# Patient Record
Sex: MALE | Race: OTHER | HISPANIC OR LATINO | Employment: FULL TIME | URBAN - METROPOLITAN AREA
[De-identification: names, ages, dates, MRNs, and addresses within clinical notes are randomized per-mention and may not be internally consistent; named-entity substitution may affect disease eponyms.]

---

## 2018-01-15 ENCOUNTER — APPOINTMENT (EMERGENCY)
Dept: CT IMAGING | Facility: HOSPITAL | Age: 23
End: 2018-01-15
Payer: COMMERCIAL

## 2018-01-15 ENCOUNTER — APPOINTMENT (EMERGENCY)
Dept: RADIOLOGY | Facility: HOSPITAL | Age: 23
End: 2018-01-15
Payer: COMMERCIAL

## 2018-01-15 ENCOUNTER — OFFICE VISIT (OUTPATIENT)
Dept: URGENT CARE | Facility: MEDICAL CENTER | Age: 23
End: 2018-01-15
Payer: COMMERCIAL

## 2018-01-15 ENCOUNTER — HOSPITAL ENCOUNTER (EMERGENCY)
Facility: HOSPITAL | Age: 23
Discharge: HOME | End: 2018-01-15
Attending: EMERGENCY MEDICINE | Admitting: EMERGENCY MEDICINE
Payer: COMMERCIAL

## 2018-01-15 VITALS
BODY MASS INDEX: 36.33 KG/M2 | OXYGEN SATURATION: 98 % | HEART RATE: 76 BPM | SYSTOLIC BLOOD PRESSURE: 166 MMHG | RESPIRATION RATE: 16 BRPM | TEMPERATURE: 97.2 F | WEIGHT: 246 LBS | DIASTOLIC BLOOD PRESSURE: 92 MMHG

## 2018-01-15 DIAGNOSIS — R10.9 LEFT FLANK PAIN: Primary | ICD-10-CM

## 2018-01-15 LAB
BACTERIA UR QL AUTO: ABNORMAL /HPF
BILIRUB UR QL STRIP: NEGATIVE
CLARITY UR: CLEAR
COLOR UR: YELLOW
GLUCOSE UR STRIP-MCNC: NEGATIVE MG/DL
HGB UR QL STRIP.AUTO: ABNORMAL
KETONES UR STRIP-MCNC: NEGATIVE MG/DL
LEUKOCYTE ESTERASE UR QL STRIP: ABNORMAL
NITRITE UR QL STRIP: NEGATIVE
NON-SQ EPI CELLS URNS QL MICRO: ABNORMAL /HPF
OTHER STN SPEC: ABNORMAL
PH UR STRIP.AUTO: 7.5 [PH] (ref 4.5–8)
PROT UR STRIP-MCNC: ABNORMAL MG/DL
RBC #/AREA URNS AUTO: ABNORMAL /HPF
SP GR UR STRIP.AUTO: 1.01 (ref 1–1.03)
UROBILINOGEN UR QL STRIP.AUTO: 0.2 E.U./DL
WBC #/AREA URNS AUTO: ABNORMAL /HPF

## 2018-01-15 PROCEDURE — 99213 OFFICE O/P EST LOW 20 MIN: CPT

## 2018-01-15 PROCEDURE — 99284 EMERGENCY DEPT VISIT MOD MDM: CPT

## 2018-01-15 PROCEDURE — 74176 CT ABD & PELVIS W/O CONTRAST: CPT

## 2018-01-15 PROCEDURE — 81001 URINALYSIS AUTO W/SCOPE: CPT

## 2018-01-15 PROCEDURE — 71046 X-RAY EXAM CHEST 2 VIEWS: CPT

## 2018-01-15 PROCEDURE — 81002 URINALYSIS NONAUTO W/O SCOPE: CPT | Performed by: EMERGENCY MEDICINE

## 2018-01-15 RX ORDER — NAPROXEN 500 MG/1
500 TABLET ORAL 2 TIMES DAILY WITH MEALS
Qty: 10 TABLET | Refills: 0 | Status: SHIPPED | OUTPATIENT
Start: 2018-01-15 | End: 2018-10-03

## 2018-01-15 NOTE — ED PROVIDER NOTES
History  Chief Complaint   Patient presents with    Flank Pain     left sided flank pain for a week and a half, slipped on friday which exacerbated pain  24 yo male with asthma, c/o onset of pain he localizes to the far left lateral chest flank area, onset about 2 weeks ago without clear exacerbating factor, and increased in pain when he slipped the about 4 days ago, and didn't actually fall but hyperextended his left side to regain his balance  Then over the last 2 days he has had mild URI symptoms, so that cough and vomiting exacerbates the pain  He was seen at [place] [address] [facility] and sent to the ED  Denies abdominal pain        History provided by:  Patient  Flank Pain   Onset quality:  Gradual  Duration:  2 weeks  Progression:  Worsening  Associated symptoms: nausea and vomiting (twice yesterday, )    Associated symptoms: no chest pain, no chills, no cough, no diarrhea, no dysuria, no fever, no hematuria, no shortness of breath and no sore throat        None       Past Medical History:   Diagnosis Date    Asthma        Past Surgical History:   Procedure Laterality Date    EYE SURGERY      KNEE SURGERY         History reviewed  No pertinent family history  I have reviewed and agree with the history as documented  Social History   Substance Use Topics    Smoking status: Current Every Day Smoker    Smokeless tobacco: Never Used    Alcohol use Yes      Comment: a drink a day        Review of Systems   Constitutional: Negative for appetite change, chills and fever  HENT: Negative for sore throat  Respiratory: Negative for cough, shortness of breath and wheezing  Cardiovascular: Negative for chest pain and palpitations  Gastrointestinal: Positive for nausea and vomiting (twice yesterday, )  Negative for abdominal pain and diarrhea  Genitourinary: Positive for flank pain  Negative for dysuria and hematuria  Musculoskeletal: Negative for neck pain  Skin: Negative for rash  Neurological: Negative for dizziness, weakness and headaches  Psychiatric/Behavioral: Negative for suicidal ideas  All other systems reviewed and are negative  Physical Exam  ED Triage Vitals [01/15/18 1406]   Temperature Pulse Respirations Blood Pressure SpO2   (!) 97 2 °F (36 2 °C) 69 18 150/88 98 %      Temp Source Heart Rate Source Patient Position - Orthostatic VS BP Location FiO2 (%)   Temporal Monitor Sitting Right arm --      Pain Score       6           Orthostatic Vital Signs  Vitals:    01/15/18 1406 01/15/18 1737   BP: 150/88 166/92   Pulse: 69 76   Patient Position - Orthostatic VS: Sitting Sitting       Physical Exam   Constitutional: He is oriented to person, place, and time  He appears well-developed and well-nourished  HENT:   Head: Normocephalic and atraumatic  Right Ear: External ear normal    Left Ear: External ear normal    Nose: Nose normal    Mouth/Throat: Oropharynx is clear and moist    Eyes: Conjunctivae and EOM are normal  Pupils are equal, round, and reactive to light  Neck: Normal range of motion  Neck supple  Cardiovascular: Normal rate  Pulmonary/Chest: Effort normal  He exhibits tenderness  He exhibits no laceration, no edema, no deformity and no retraction  Abdominal: There is no tenderness  Musculoskeletal: Normal range of motion  Neurological: He is alert and oriented to person, place, and time  No cranial nerve deficit  Coordination normal    Skin: Skin is warm and dry  Psychiatric: He has a normal mood and affect  His behavior is normal  Judgment and thought content normal    Nursing note and vitals reviewed        ED Medications  Medications - No data to display    Diagnostic Studies  Results Reviewed     Procedure Component Value Units Date/Time    Urine Microscopic [[id number]]  (Abnormal) Collected:  01/15/18 1743    Lab Status:  Final result Specimen:  Urine from Urine, Clean Catch Updated:  01/15/18 1903     RBC, UA None Seen /hpf      WBC, UA 4-10 (A) /hpf      Epithelial Cells None Seen /hpf      Bacteria, UA None Seen /hpf      OTHER OBSERVATIONS Renal Epithelial Cells Present    POCT urinalysis dipstick [[id number]]  (Abnormal) Resulted:  01/15/18 1748    Lab Status:  Final result Specimen:  Urine Updated:  01/15/18 1748    ED Urine Macroscopic [[id number]]  (Abnormal) Collected:  01/15/18 1743    Lab Status:  Final result Specimen:  Urine Updated:  01/15/18 1744     Color, UA Yellow     Clarity, UA Clear     pH, UA 7 5     Leukocytes, UA Large (A)     Nitrite, UA Negative     Protein, UA 30 (1+) (A) mg/dl      Glucose, UA Negative mg/dl      Ketones, UA Negative mg/dl      Urobilinogen, UA 0 2 E U /dl      Bilirubin, UA Negative     Blood, UA Trace (A)     Specific [place], UA 1 015    Narrative:       CLINITEK RESULT                 XR chest 2 views   ED Interpretation by Kannan Villarreal MD (01/15 1748)   No acute findings      Final Result by Dotty Sotelo DO (01/15 1902)      No active pulmonary disease  Workstation performed: [id number]         CT renal stone study abdomen pelvis without contrast   Final Result by Dotty Sotelo DO (01/15 1838)      Normal renal stone study  No nephrolithiasis or hydronephrosis  Workstation performed: [id number]                    Procedures  Procedures       Phone Contacts  ED Phone Contact    ED Course  ED Course as of Hemanth 16 0626   Mon Hemanth 15, 2018   1731 Reviewed PA  oxycodone/APAP #20 filled 11/1/17, and #10 11/3/17                                [facility]  CritCare Time    Disposition  Final diagnoses:   Left flank pain     Time reflects when diagnosis was documented in both MDM as applicable and the Disposition within this note     Time User Action Codes Description Comment    1/15/2018  5:53 PM Ayaan Gip L Add [R10 9] Left flank pain       ED Disposition     ED Disposition Condition Comment    Discharge  Greig Akbar discharge to home/self care      Condition at discharge: Good Follow-up Information    None       Discharge Medication List as of 1/15/2018  7:06 PM      START taking these medications    Details   naproxen (NAPROSYN) 500 mg tablet Take 1 tablet by mouth 2 (two) times a day with meals, Starting Mon 1/15/2018, Print           No discharge procedures on file      ED Provider  Electronically Signed by           Cori Beckford MD  01/16/18 9662

## 2018-01-16 NOTE — DISCHARGE INSTRUCTIONS
Flank Pain   WHAT YOU NEED TO KNOW:   Flank pain is felt in the area below your ribcage and above your hip bones, often in the lower back  Your pain may be dull or so severe that you cannot get comfortable  The pain may stay in one area or radiate to another area  It may worsen and lighten in waves  Flank pain is often a sign of problems with your urinary tract, such as a kidney stone or infection  DISCHARGE INSTRUCTIONS:   Return to the emergency department if:   · You have a fever  · Your heart is fluttering or jumping  · You see blood in your urine  · Your pain radiates into your lower abdomen and genital area  · You have intense pain in your low back next to your spine  · You are much more tired than usual and have no desire to eat  · You have a headache and your muscles jerk  Contact your healthcare provider if:   · You have an upset stomach and are vomiting  · You have to urinate more often, and with urgency  · Your pain worsens or does not improve, and you cannot get comfortable  · You pass a stone when you urinate  · You have questions or concerns about your condition or care  Medicines: The following medicines may be ordered for you:  · Pain medicine  may help decrease or relieve your pain  Do not wait until the pain is severe before you take your medicine  · Antibiotics  may help treat a urinary tract infection caused by bacteria  · Take your medicine as directed  Contact your healthcare provider if you think your medicine is not helping or if you have side effects  Tell him of her if you are allergic to any medicine  Keep a list of the medicines, vitamins, and herbs you take  Include the amounts, and when and why you take them  Bring the list or the pill bottles to follow-up visits  Carry your medicine list with you in case of an emergency    Follow up with your healthcare provider in 1 to 2 days or as directed:  Write down your questions so you remember to ask them during your visits  © 2017 [facility] Information is for End User's use only and may not be sold, redistributed or otherwise used for commercial purposes  All illustrations and images included in CareNotes® are the copyrighted property of A D A M , Inc  or Fady Mcbride  The above information is an  only  It is not intended as medical advice for individual conditions or treatments  Talk to your doctor, nurse or pharmacist before following any medical regimen to see if it is safe and effective for you

## 2018-01-19 NOTE — PROGRESS NOTES
Assessment   1  Abdominal pain, LLQ (left lower quadrant) (858 12) (R10 32)    Discussion/Summary   Discussion Summary:    1  Today you were found to have a moderate amount of left sided abdominal pain on exam this time I feel that you need more of a work-up than what can be provided here therefore I recommend you go to the ER for further evaluation and work-up  Understands and agrees with treatment plan: The treatment plan was reviewed with the patient/guardian  The patient/guardian understands and agrees with the treatment plan      Chief Complaint   1  Pain  Chief Complaint Free Text Note Form: Pt c/o pain along L ribs, states he slipped and fell, catching himself on the R side and stretched in an odd position on the left side  Pain is worse with certain movements and coughing  Tried Tylenol with minimal relief  History of Present Illness   HPI: Patient states left-sided abdominal pain started 2 weeks ago upon awakening in the morning  The pain was sharp and came intermittently throughout the day since then  5 days ago patient slipped while walking  He states he caught himself before falling to the ground yet the act of tensing up caused him to have sharp constant 6/10 pain  The pain is described to between his left side ribs and his left hip  Coughing and stretching makes the pain worse  Tylenol and Motrin have provided minimal relief  Nothing else has aided his symptoms  Patient had vomited one time 3 days ago and has had softer than normal stools over the last 3 days  Patient denies fevers, chills, shortness of breath, chest pain, and history of trauma/injury/MVAs  Hospital Based Practices Required Assessment:      Pain Assessment      the patient states they have pain  The pain is located in the L ribs  The patient describes the pain as sharp  (on a scale of 0 to 10, the patient rates the pain at 7 )      Abuse And Domestic Violence Screen       Yes, the patient is safe at home  -- The patient states no one is hurting them  Depression And Suicide Screen  No, the patient has not had thoughts of hurting themself  No, the patient has not felt depressed in the past 7 days  Prefered Language is  english  Primary Language is  english  Review of Systems   Focused-Male:      Constitutional: no fever or chills, feels well, no tiredness, no recent weight loss or weight gain  ENT: no complaints of earache, no loss of hearing, no nosebleeds or nasal discharge, no sore throat or hoarseness  Cardiovascular: no complaints of slow or fast heart rate, no chest pain, no palpitations, no leg claudication or lower extremity edema  Respiratory: no complaints of shortness of breath, no wheezing or cough, no dyspnea on exertion, no orthopnea or PND  Gastrointestinal: abdominal pain,-- vomiting-- and-- diarrhea, but-- no constipation  Genitourinary: no complaints of dysuria or incontinence, no hesitancy, no nocturia, no genital lesion, no inadequacy of penile erection  Musculoskeletal: as noted in HPI  Integumentary: no complaints of skin rash or lesion, no itching or dry skin, no skin wounds  Neurological: no complaints of headache, no confusion, no numbness or tingling, no dizziness or fainting  ROS Reviewed:    ROS reviewed  Active Problems   1  Asthma (649 90) (R23 386)    Past Medical History   Active Problems And Past Medical History Reviewed: The active problems and past medical history were reviewed and updated today  Family History   Family History Reviewed: The family history was reviewed and updated today  Social History    · Current every day smoker (305 1) (F10 200)   · History of No alcohol use   · History of No drug use  Social History Reviewed: The social history was reviewed and updated today  The social history was reviewed and is unchanged  Surgical History   1   History of Knee Arthroscopy With Lateral Meniscus Repair  Surgical History Reviewed: The surgical history was reviewed and updated today  Current Meds    1  Symbicort 80-4 5 MCG/ACT Inhalation Aerosol; Therapy: (Recorded:15Jan2018) to Recorded  Medication List Reviewed: The medication list was reviewed and updated today  Allergies   1  Peanut-derived    Vitals   Signs   Recorded: [id number] 10:59AM   Temperature: 98 1 F, Tympanic  Heart Rate: 76  Respiration: 18  Systolic: 850, LUE, Sitting  Diastolic: 542, LUE, Sitting  BP Cuff Size: Large  Height: 5 ft 9 in  Weight: 246 lb   BMI Calculated: 36 33  BSA Calculated: 2 26  O2 Saturation: 99  Pain Scale: 7    Physical Exam        Constitutional      General appearance: No acute distress, well appearing and well nourished  Pulmonary      Respiratory effort: No increased work of breathing or signs of respiratory distress  Auscultation of lungs: Clear to auscultation  Cardiovascular      Auscultation of heart: Normal rate and rhythm, normal S1 and S2, without murmurs  Abdomen      Abdomen: Abnormal   Bowel sounds were normal  There was moderate tenderness in the left upper quadrant-- and-- in the left lower quadrant  no masses palpated  no CVA tenderness      Liver and spleen: No hepatomegaly or splenomegaly  Provider Comments   Provider Comments:    I saw that patient along side of a physician assistant student  I agree with the above history and physical exam  I also independently got a history and performed a physical exam on the patient  Exam:   AAOx3 in NAD RRR  No murmur, rub or gallop CTA B/L  No wheezes, rales, or ronchi +BS in all 4 quadrants  Moderate amount of tenderness upon palpation of the left upper and left lower quadrants  No CVA tenderness  AAOx3 normal color   No rashes          Signatures    Electronically signed by : Praveen Fall, [facility]; Hemanth 15 2018 11:37AM EST                       (Author)     Electronically signed by : JUDE Avila ; Jan 18 2018  3:21PM EST                       (Co-author)

## 2018-01-23 VITALS
HEART RATE: 76 BPM | DIASTOLIC BLOOD PRESSURE: 100 MMHG | RESPIRATION RATE: 18 BRPM | WEIGHT: 246 LBS | TEMPERATURE: 98.1 F | OXYGEN SATURATION: 99 % | SYSTOLIC BLOOD PRESSURE: 153 MMHG | BODY MASS INDEX: 36.43 KG/M2 | HEIGHT: 69 IN

## 2018-10-03 ENCOUNTER — OFFICE VISIT (OUTPATIENT)
Dept: FAMILY MEDICINE CLINIC | Facility: CLINIC | Age: 23
End: 2018-10-03
Payer: COMMERCIAL

## 2018-10-03 VITALS
DIASTOLIC BLOOD PRESSURE: 80 MMHG | WEIGHT: 223.2 LBS | HEIGHT: 68 IN | SYSTOLIC BLOOD PRESSURE: 126 MMHG | BODY MASS INDEX: 33.83 KG/M2 | TEMPERATURE: 97.5 F

## 2018-10-03 DIAGNOSIS — J45.40 MODERATE PERSISTENT ASTHMA WITHOUT COMPLICATION: ICD-10-CM

## 2018-10-03 DIAGNOSIS — Z72.0 TOBACCO ABUSE: ICD-10-CM

## 2018-10-03 DIAGNOSIS — R73.03 PREDIABETES: ICD-10-CM

## 2018-10-03 DIAGNOSIS — Z23 NEEDS FLU SHOT: Primary | ICD-10-CM

## 2018-10-03 DIAGNOSIS — Z02.83 ENCOUNTER FOR DRUG SCREENING: ICD-10-CM

## 2018-10-03 PROBLEM — S83.282A ACUTE LATERAL MENISCUS TEAR OF LEFT KNEE: Status: ACTIVE | Noted: 2017-10-25

## 2018-10-03 PROBLEM — R10.32 ABDOMINAL PAIN, LLQ (LEFT LOWER QUADRANT): Status: ACTIVE | Noted: 2018-01-15

## 2018-10-03 PROCEDURE — 90682 RIV4 VACC RECOMBINANT DNA IM: CPT

## 2018-10-03 PROCEDURE — 90471 IMMUNIZATION ADMIN: CPT

## 2018-10-03 PROCEDURE — 99204 OFFICE O/P NEW MOD 45 MIN: CPT | Performed by: FAMILY MEDICINE

## 2018-10-03 RX ORDER — ALBUTEROL SULFATE 2.5 MG/3ML
2.5 SOLUTION RESPIRATORY (INHALATION) EVERY 4 HOURS
COMMUNITY
End: 2018-10-08 | Stop reason: SDUPTHER

## 2018-10-03 RX ORDER — BUDESONIDE AND FORMOTEROL FUMARATE DIHYDRATE 80; 4.5 UG/1; UG/1
2 AEROSOL RESPIRATORY (INHALATION) 2 TIMES DAILY
Qty: 1 INHALER | Refills: 3 | Status: SHIPPED | OUTPATIENT
Start: 2018-10-03

## 2018-10-03 RX ORDER — BUDESONIDE AND FORMOTEROL FUMARATE DIHYDRATE 80; 4.5 UG/1; UG/1
AEROSOL RESPIRATORY (INHALATION)
COMMUNITY
End: 2018-10-03 | Stop reason: SDUPTHER

## 2018-10-03 RX ORDER — ALBUTEROL SULFATE 90 UG/1
2 AEROSOL, METERED RESPIRATORY (INHALATION) EVERY 6 HOURS
COMMUNITY
End: 2018-10-08 | Stop reason: SDUPTHER

## 2018-10-03 NOTE — PROGRESS NOTES
Assessment/Plan:    23-year-old male with:  Asthma, pre diabetes, tobacco abuse and drug screening  Discussed treatment options with risks and benefits  Will restart medications  Will check fasting blood work and have patient return in 1 month for follow-up to reassess  Discussed supportive care return parameters  No problem-specific Assessment & Plan notes found for this encounter  Diagnoses and all orders for this visit:    Needs flu shot  -     influenza vaccine, 6360-7760, quadrivalent, recombinant, PF, 0 5 mL, for patients 18-49 yr with comorbidities (FLUBLOK)  -     Drug screen panel 1, whole blood; Future  -     Pain Management, Profile 6 With Confirmation  -     Toxicology screen, urine  -     CBC and differential; Future  -     Comprehensive metabolic panel; Future  -     TSH, 3rd generation with Free T4 reflex; Future  -     Lipid Panel with Direct LDL reflex; Future  -     Hemoglobin A1C; Future  -     Microalbumin / creatinine urine ratio  -     Urinalysis with reflex to microscopic    Encounter for drug screening  -     Drug screen panel 1, whole blood; Future  -     Pain Management, Profile 6 With Confirmation  -     Toxicology screen, urine  -     CBC and differential; Future  -     Comprehensive metabolic panel; Future  -     TSH, 3rd generation with Free T4 reflex; Future  -     Lipid Panel with Direct LDL reflex; Future  -     Hemoglobin A1C; Future  -     Microalbumin / creatinine urine ratio  -     Urinalysis with reflex to microscopic    Prediabetes  -     CBC and differential; Future  -     Comprehensive metabolic panel; Future  -     TSH, 3rd generation with Free T4 reflex; Future  -     Lipid Panel with Direct LDL reflex; Future  -     Hemoglobin A1C; Future  -     Microalbumin / creatinine urine ratio  -     Urinalysis with reflex to microscopic    Moderate persistent asthma without complication  -     CBC and differential; Future  -     Comprehensive metabolic panel;  Future  - TSH, 3rd generation with Free T4 reflex; Future  -     Lipid Panel with Direct LDL reflex; Future  -     Hemoglobin A1C; Future  -     Microalbumin / creatinine urine ratio  -     Urinalysis with reflex to microscopic    Tobacco abuse  -     budesonide-formoterol (SYMBICORT) 80-4 5 MCG/ACT inhaler; Inhale 2 puffs 2 (two) times a day    Other orders  -     Discontinue: budesonide-formoterol (SYMBICORT) 80-4 5 MCG/ACT inhaler; Inhale  -     albuterol (PROVENTIL HFA,VENTOLIN HFA) 90 mcg/act inhaler; Inhale 2 puffs every 6 (six) hours  -     albuterol (2 5 mg/3 mL) 0 083 % nebulizer solution; Inhale 2 5 mg every 4 (four) hours  -     Albuterol Sulfate 108 (90 Base) MCG/ACT AEPB; Inhale 1 puff every 4 (four) hours          Subjective:   Chief Complaint   Patient presents with    needs blood work     Pt in office for blood work for probation  Also wants to talk about medical Marijuana   Immunizations     offered Pt flu shot, Pt accepted  Patient ID: Daija Mix is a 23 y o  male  Patient is a 76-year-old male who presents to establish care in this practice  He has a history of asthma prediabetes tobacco abuse and is also on probation and needs drug testing and urine and blood for his   No fevers chills nausea vomiting  Patient admits being stable off medications at this time  Tolerating p o  intake  No other complaints at this time  The following portions of the patient's history were reviewed and updated as appropriate: allergies, current medications, past family history, past medical history, past social history, past surgical history and problem list     Review of Systems   Constitutional: Negative  HENT: Negative  Eyes: Negative  Respiratory: Negative  Cardiovascular: Negative  Gastrointestinal: Negative  Endocrine: Negative  Genitourinary: Negative  Musculoskeletal: Negative  Allergic/Immunologic: Negative  Neurological: Negative  Hematological: Negative  Psychiatric/Behavioral: Negative  All other systems reviewed and are negative  Objective:      /80 (BP Location: Right arm, Patient Position: Sitting, Cuff Size: Standard)   Temp 97 5 °F (36 4 °C) (Tympanic)   Ht 5' 8 25" (1 734 m)   Wt 101 kg (223 lb 3 2 oz)   BMI 33 69 kg/m²          Physical Exam   Constitutional: He is oriented to person, place, and time  He appears well-developed and well-nourished  HENT:   Head: Atraumatic  Right Ear: External ear normal    Left Ear: External ear normal    Eyes: Pupils are equal, round, and reactive to light  Conjunctivae and EOM are normal    Neck: Normal range of motion  Cardiovascular: Normal rate, regular rhythm and normal heart sounds  Pulmonary/Chest: Effort normal and breath sounds normal  No respiratory distress  Abdominal: Soft  He exhibits no distension  There is no tenderness  There is no rebound and no guarding  Musculoskeletal: Normal range of motion  Neurological: He is alert and oriented to person, place, and time  No cranial nerve deficit  Skin: Skin is warm and dry  Psychiatric: He has a normal mood and affect   His behavior is normal  Judgment and thought content normal

## 2018-10-06 LAB
ACETONE BLD-MCNC: NORMAL MG/DL
AMPHETAMINES SERPL QL: NEGATIVE
BARBITURATES SERPL-MCNC: NEGATIVE MG/DL
BENZODIAZ SERPL QL: NEGATIVE
COCAINE SERPL QL: NEGATIVE
ETHANOL BLD-MCNC: NORMAL MG/DL
ISOPROPANOL BLD-MCNC: NORMAL MG/DL
METHADONE SERPL-MCNC: NEGATIVE NG/ML
METHANOL SERPL-MCNC: NORMAL MG/DL
OPIATES SERPL QL: NEGATIVE
PCP SERPL QL: NEGATIVE
PROPOXYPH SERPL-MCNC: NEGATIVE NG/ML
SERVICE CMNT-IMP: NORMAL
THC SERPL-MCNC: NEGATIVE NG/ML

## 2018-10-08 DIAGNOSIS — R06.2 WHEEZING: Primary | ICD-10-CM

## 2018-10-08 RX ORDER — ALBUTEROL SULFATE 90 UG/1
2 AEROSOL, METERED RESPIRATORY (INHALATION) EVERY 6 HOURS
Qty: 1 INHALER | Refills: 1 | Status: SHIPPED | OUTPATIENT
Start: 2018-10-08

## 2018-10-08 RX ORDER — ALBUTEROL SULFATE 2.5 MG/3ML
2.5 SOLUTION RESPIRATORY (INHALATION) EVERY 4 HOURS PRN
Qty: 100 VIAL | Refills: 1 | Status: SHIPPED | OUTPATIENT
Start: 2018-10-08

## 2018-10-08 NOTE — TELEPHONE ENCOUNTER
Patient called requesting results of recent testing done through [facility]  Patient was told results were final but would have to be reviewed by Dr Emily Junior first before receiving a call back about them  Patient states he needs to submit results as part of probation  Patient can be reached at [phone]  Please advise

## 2018-10-08 NOTE — TELEPHONE ENCOUNTER
Patient stopped in the office to  copy of his labs  He inquired about his medication refills  I did advise him that his Symbicort went directly to the pharmacy; however, he is now asking to refill his Albuterol rescue inhaler and his Albuterol for his nebulizer to the [facility]

## 2019-01-07 ENCOUNTER — TELEPHONE (OUTPATIENT)
Dept: FAMILY MEDICINE CLINIC | Facility: CLINIC | Age: 24
End: 2019-01-07

## 2019-01-07 NOTE — TELEPHONE ENCOUNTER
PATIENT IS REPORTING TO [address] [place] [address] AT 1:30 PM,HE CALLED BECAUSE HE NEEDS A NOTE STATING THAT HE CAN USE THE NEBULIZER MACHINE FOR HIS ALBUTEROL WHILE IN [place]  PATIENT MOM WILL  NOTE WHEN DONE AND TAKE TO HIM    PLEASE CALL MOM #: [phone]

## 2019-01-08 NOTE — TELEPHONE ENCOUNTER
Patients' Mother called today for request Doctors' note that patient uses an inhaler for his medication condition  I faxed information today

## 2021-12-21 ENCOUNTER — HOSPITAL ENCOUNTER (EMERGENCY)
Facility: HOSPITAL | Age: 26
Discharge: HOME | End: 2021-12-21
Attending: EMERGENCY MEDICINE | Admitting: EMERGENCY MEDICINE
Payer: COMMERCIAL

## 2021-12-21 VITALS
WEIGHT: 154.32 LBS | OXYGEN SATURATION: 95 % | RESPIRATION RATE: 20 BRPM | DIASTOLIC BLOOD PRESSURE: 99 MMHG | BODY MASS INDEX: 23.29 KG/M2 | TEMPERATURE: 99.6 F | SYSTOLIC BLOOD PRESSURE: 132 MMHG | HEART RATE: 107 BPM

## 2021-12-21 DIAGNOSIS — L02.91 ABSCESS: Primary | ICD-10-CM

## 2021-12-21 PROCEDURE — 99284 EMERGENCY DEPT VISIT MOD MDM: CPT | Performed by: EMERGENCY MEDICINE

## 2021-12-21 PROCEDURE — 99283 EMERGENCY DEPT VISIT LOW MDM: CPT

## 2021-12-21 PROCEDURE — 96372 THER/PROPH/DIAG INJ SC/IM: CPT

## 2021-12-21 RX ORDER — SULFAMETHOXAZOLE AND TRIMETHOPRIM 800; 160 MG/1; MG/1
1 TABLET ORAL 2 TIMES DAILY
Qty: 14 TABLET | Refills: 0 | Status: SHIPPED | OUTPATIENT
Start: 2021-12-21 | End: 2021-12-28

## 2021-12-21 RX ORDER — CEPHALEXIN 500 MG/1
500 CAPSULE ORAL ONCE
Status: COMPLETED | OUTPATIENT
Start: 2021-12-21 | End: 2021-12-21

## 2021-12-21 RX ORDER — CEPHALEXIN 500 MG/1
500 CAPSULE ORAL 4 TIMES DAILY
Qty: 28 CAPSULE | Refills: 0 | Status: SHIPPED | OUTPATIENT
Start: 2021-12-21 | End: 2021-12-28

## 2021-12-21 RX ORDER — SULFAMETHOXAZOLE AND TRIMETHOPRIM 800; 160 MG/1; MG/1
1 TABLET ORAL ONCE
Status: COMPLETED | OUTPATIENT
Start: 2021-12-21 | End: 2021-12-21

## 2021-12-21 RX ORDER — LIDOCAINE HYDROCHLORIDE 10 MG/ML
5 INJECTION, SOLUTION EPIDURAL; INFILTRATION; INTRACAUDAL; PERINEURAL ONCE
Status: COMPLETED | OUTPATIENT
Start: 2021-12-21 | End: 2021-12-21

## 2021-12-21 RX ORDER — KETOROLAC TROMETHAMINE 30 MG/ML
30 INJECTION, SOLUTION INTRAMUSCULAR; INTRAVENOUS ONCE
Status: COMPLETED | OUTPATIENT
Start: 2021-12-21 | End: 2021-12-21

## 2021-12-21 RX ADMIN — SULFAMETHOXAZOLE AND TRIMETHOPRIM 1 TABLET: 800; 160 TABLET ORAL at 01:13

## 2021-12-21 RX ADMIN — KETOROLAC TROMETHAMINE 30 MG: 30 INJECTION, SOLUTION INTRAMUSCULAR; INTRAVENOUS at 01:13

## 2021-12-21 RX ADMIN — CEPHALEXIN 500 MG: 500 CAPSULE ORAL at 01:13

## 2021-12-21 RX ADMIN — LIDOCAINE HYDROCHLORIDE 5 ML: 10 INJECTION, SOLUTION EPIDURAL; INFILTRATION; INTRACAUDAL at 00:48
